# Patient Record
(demographics unavailable — no encounter records)

---

## 2025-06-03 NOTE — REASON FOR VISIT
[Initial] : an initial consultation for [Urinary Complaints] : urinary complaints [Vulvar/Vaginal Complaint] : vulvar/vaginal complaint